# Patient Record
Sex: FEMALE
[De-identification: names, ages, dates, MRNs, and addresses within clinical notes are randomized per-mention and may not be internally consistent; named-entity substitution may affect disease eponyms.]

---

## 2023-04-12 PROBLEM — Z00.00 ENCOUNTER FOR PREVENTIVE HEALTH EXAMINATION: Status: ACTIVE | Noted: 2023-04-12

## 2023-04-13 ENCOUNTER — APPOINTMENT (OUTPATIENT)
Dept: PULMONOLOGY | Facility: CLINIC | Age: 73
End: 2023-04-13
Payer: MEDICARE

## 2023-04-13 VITALS
DIASTOLIC BLOOD PRESSURE: 82 MMHG | HEIGHT: 60 IN | SYSTOLIC BLOOD PRESSURE: 130 MMHG | HEART RATE: 78 BPM | WEIGHT: 90 LBS | OXYGEN SATURATION: 94 % | BODY MASS INDEX: 17.67 KG/M2 | RESPIRATION RATE: 16 BRPM

## 2023-04-13 PROCEDURE — 99203 OFFICE O/P NEW LOW 30 MIN: CPT

## 2023-04-13 NOTE — CONSULT LETTER
[Dear  ___] : Dear  [unfilled], [Consult Letter:] : I had the pleasure of evaluating your patient, [unfilled]. [Please see my note below.] : Please see my note below. [Consult Closing:] : Thank you very much for allowing me to participate in the care of this patient.  If you have any questions, please do not hesitate to contact me. [Sincerely,] : Sincerely, [DrRuben  ___] : Dr. DICKINSON

## 2023-05-12 NOTE — DISCUSSION/SUMMARY
[FreeTextEntry1] : Assess\par \par COPD\par DM allergy\par Rhinititis\par \par Plan\par \par Truncal elevation of 30 degrees for sleep.  Avoid eating within 90 minutes of the hour of sleep.\par Breztri trial\par Hydrate\par CXR\par PFT\par One month FU

## 2023-05-12 NOTE — HISTORY OF PRESENT ILLNESS
[TextBox_4] : 20 pack year smoker - [place] 2005\par Recumbent cough\par DM, mold and IVP dye allergy\par Filtered air, air purifyer, DM measures in place\par [place] and thick am sputum\par ProAir and albuterol neb\par No glaucoma\par PNT on Fluticasone and Azelastine\par Lives in [place] - [place] in [place] \par Pulm in [place] gave her percussive vest

## 2023-05-15 ENCOUNTER — APPOINTMENT (OUTPATIENT)
Dept: PULMONOLOGY | Facility: CLINIC | Age: 73
End: 2023-05-15
Payer: MEDICARE

## 2023-05-15 VITALS
HEART RATE: 85 BPM | SYSTOLIC BLOOD PRESSURE: 138 MMHG | HEIGHT: 57 IN | DIASTOLIC BLOOD PRESSURE: 80 MMHG | OXYGEN SATURATION: 97 % | WEIGHT: 90 LBS | BODY MASS INDEX: 19.41 KG/M2 | RESPIRATION RATE: 16 BRPM

## 2023-05-15 VITALS — BODY MASS INDEX: 19.41 KG/M2 | WEIGHT: 90 LBS | HEIGHT: 57 IN

## 2023-05-15 DIAGNOSIS — J44.9 CHRONIC OBSTRUCTIVE PULMONARY DISEASE, UNSPECIFIED: ICD-10-CM

## 2023-05-15 DIAGNOSIS — R05.3 CHRONIC COUGH: ICD-10-CM

## 2023-05-15 DIAGNOSIS — J30.9 ALLERGIC RHINITIS, UNSPECIFIED: ICD-10-CM

## 2023-05-15 PROCEDURE — 94010 BREATHING CAPACITY TEST: CPT

## 2023-05-15 PROCEDURE — 99214 OFFICE O/P EST MOD 30 MIN: CPT | Mod: 25

## 2023-05-15 RX ORDER — AZITHROMYCIN 250 MG/1
250 TABLET, FILM COATED ORAL
Qty: 45 | Refills: 3 | Status: ACTIVE | COMMUNITY
Start: 2023-05-15 | End: 1900-01-01

## 2023-05-15 RX ORDER — BENZONATATE 200 MG/1
200 CAPSULE ORAL 3 TIMES DAILY
Qty: 90 | Refills: 1 | Status: ACTIVE | COMMUNITY
Start: 2023-05-15 | End: 1900-01-01

## 2023-05-15 NOTE — DISCUSSION/SUMMARY
[FreeTextEntry1] : Assess\par \par COPD\par DM allergy\par Rhinititis\par \par Plan\par \par Truncal elevation of 30 degrees for sleep.  Avoid eating within 90 minutes of the hour of sleep.\par Anoro - Rescue albuterol - technique corrected \par Hydrate\par Benzonatate\par Azelastine\par Suppressive Azithromycin\par Vest with postural drainage when back in [place]par 6 month FU

## 2023-05-15 NOTE — HISTORY OF PRESENT ILLNESS
[TextBox_4] : 20 pack year smoker - [place] 2005\par Recumbent cough\par DM, mold and IVP dye allergy\par Filtered air, air purifyer, DM measures in place\par [place] and thick am sputum\par ProAir and albuterol neb\par No glaucoma\par PNT on Fluticasone and Azelastine\par Lives in [place] - [place] in [place] \par [place]m in [place] gave her percussive vest \par \par 5/15/23\par Dislikes steroids of all forms\par Azelastine helped a little\par Tickle in throat\par Minimal mucous\par Back to [place] next week

## 2023-06-14 ENCOUNTER — APPOINTMENT (OUTPATIENT)
Dept: PULMONOLOGY | Facility: CLINIC | Age: 73
End: 2023-06-14

## 2023-09-20 ENCOUNTER — OFFICE (OUTPATIENT)
Dept: URBAN - METROPOLITAN AREA CLINIC 104 | Facility: CLINIC | Age: 73
Setting detail: OPHTHALMOLOGY
End: 2023-09-20
Payer: MEDICARE

## 2023-09-20 DIAGNOSIS — H25.12: ICD-10-CM

## 2023-09-20 DIAGNOSIS — H25.13: ICD-10-CM

## 2023-09-20 DIAGNOSIS — H43.813: ICD-10-CM

## 2023-09-20 DIAGNOSIS — H34.8121: ICD-10-CM

## 2023-09-20 DIAGNOSIS — H35.033: ICD-10-CM

## 2023-09-20 PROCEDURE — 92134 CPTRZ OPH DX IMG PST SGM RTA: CPT | Performed by: SPECIALIST

## 2023-09-20 PROCEDURE — 92136 OPHTHALMIC BIOMETRY: CPT | Performed by: SPECIALIST

## 2023-09-20 PROCEDURE — 92004 COMPRE OPH EXAM NEW PT 1/>: CPT | Performed by: SPECIALIST

## 2023-09-20 ASSESSMENT — REFRACTION_MANIFEST
OS_CYLINDER: -1.00
OS_VA2: 20/30(J2)
OS_SPHERE: -5.00
OS_AXIS: 125
OS_ADD: +2.75
OS_VA1: 20/70

## 2023-09-20 ASSESSMENT — REFRACTION_CURRENTRX
OD_AXIS: 24
OD_CYLINDER: -1.50
OS_AXIS: 124
OD_SPHERE: -2.50
OS_SPHERE: -3.00
OD_OVR_VA: 20/
OS_CYLINDER: -1.25
OD_ADD: +2.00
OS_ADD: +2.00
OS_OVR_VA: 20/

## 2023-09-20 ASSESSMENT — REFRACTION_AUTOREFRACTION
OS_AXIS: 27
OD_SPHERE: -3.50
OS_SPHERE: -6.75
OD_AXIS: 179
OD_CYLINDER: -0.75
OS_CYLINDER: -1.00

## 2023-09-20 ASSESSMENT — KERATOMETRY
OD_K1K2_AVERAGE: 43.86
OD_CYLPOWER_DEGREES: 0.73999999
OS_K1POWER_DIOPTERS: 43.89
OD_AXISANGLE_DEGREES: 85
OS_K1K2_AVERAGE: 44.035
OD_K1POWER_DIOPTERS: 43.49
OS_CYLAXISANGLE_DEGREES: 7
OS_AXISANGLE_DEGREES: 7
OS_CYLPOWER_DEGREES: 0.29
OD_K1POWER_DIOPTERS: 43.49
OD_K2POWER_DIOPTERS: 44.23
OD_CYLAXISANGLE_DEGREES: 85
OD_AXISANGLE_DEGREES: 85
OS_AXISANGLE2_DEGREES: 7
OS_K2POWER_DIOPTERS: 44.18
OS_K1POWER_DIOPTERS: 43.89
OD_AXISANGLE2_DEGREES: 85
OS_K2POWER_DIOPTERS: 44.18
OS_AXISANGLE_DEGREES: 7
OD_K2POWER_DIOPTERS: 44.23

## 2023-09-20 ASSESSMENT — VISUAL ACUITY
OD_BCVA: 20/70
OS_BCVA: 20/25

## 2023-09-20 ASSESSMENT — TONOMETRY
OD_IOP_MMHG: 12
OS_IOP_MMHG: 12

## 2023-09-20 ASSESSMENT — CONFRONTATIONAL VISUAL FIELD TEST (CVF)
OD_FINDINGS: FULL
OS_FINDINGS: FULL

## 2023-09-20 ASSESSMENT — AXIALLENGTH_DERIVED
OS_AL: 25.71
OS_AL: 26.55
OD_AL: 25.05

## 2023-09-20 ASSESSMENT — SPHEQUIV_DERIVED
OD_SPHEQUIV: -3.875
OS_SPHEQUIV: -7.25
OS_SPHEQUIV: -5.5

## 2023-09-28 ENCOUNTER — RX ONLY (RX ONLY)
Age: 73
End: 2023-09-28

## 2023-09-29 ENCOUNTER — ASC (OUTPATIENT)
Dept: URBAN - METROPOLITAN AREA SURGERY 8 | Facility: SURGERY | Age: 73
Setting detail: OPHTHALMOLOGY
End: 2023-09-29
Payer: MEDICARE

## 2023-09-29 DIAGNOSIS — H52.212: ICD-10-CM

## 2023-09-29 DIAGNOSIS — H25.12: ICD-10-CM

## 2023-09-29 PROCEDURE — 66984 XCAPSL CTRC RMVL W/O ECP: CPT | Performed by: SPECIALIST

## 2023-09-29 PROCEDURE — FEMTO FEMTOSECOND LASER: Performed by: SPECIALIST

## 2023-09-30 ENCOUNTER — OFFICE (OUTPATIENT)
Dept: URBAN - METROPOLITAN AREA CLINIC 104 | Facility: CLINIC | Age: 73
Setting detail: OPHTHALMOLOGY
End: 2023-09-30
Payer: MEDICARE

## 2023-09-30 DIAGNOSIS — Z96.1: ICD-10-CM

## 2023-09-30 PROCEDURE — 99024 POSTOP FOLLOW-UP VISIT: CPT | Performed by: OPTOMETRIST

## 2023-09-30 ASSESSMENT — VISUAL ACUITY
OS_BCVA: 20/25
OD_BCVA: 20/100

## 2023-09-30 ASSESSMENT — CONFRONTATIONAL VISUAL FIELD TEST (CVF)
OD_FINDINGS: FULL
OS_FINDINGS: FULL

## 2023-09-30 ASSESSMENT — TONOMETRY: OS_IOP_MMHG: 12

## 2023-09-30 ASSESSMENT — REFRACTION_CURRENTRX
OD_OVR_VA: 20/
OS_OVR_VA: 20/

## 2023-10-04 ENCOUNTER — OFFICE (OUTPATIENT)
Dept: URBAN - METROPOLITAN AREA CLINIC 104 | Facility: CLINIC | Age: 73
Setting detail: OPHTHALMOLOGY
End: 2023-10-04
Payer: MEDICARE

## 2023-10-04 DIAGNOSIS — Z96.1: ICD-10-CM

## 2023-10-04 PROBLEM — H34.8121 CRVO; LEFT EYE W/RET NEOVASCULARIZATION: Status: ACTIVE | Noted: 2023-09-20

## 2023-10-04 PROBLEM — H35.033: Status: ACTIVE | Noted: 2023-09-20

## 2023-10-04 PROBLEM — H25.11: Status: ACTIVE | Noted: 2023-09-20

## 2023-10-04 PROBLEM — H43.813 POSTERIOR VITREOUS DETACHMENT; BOTH EYES: Status: ACTIVE | Noted: 2023-09-20

## 2023-10-04 PROCEDURE — 99024 POSTOP FOLLOW-UP VISIT: CPT | Performed by: OPTOMETRIST

## 2023-10-04 ASSESSMENT — KERATOMETRY
OD_K1POWER_DIOPTERS: 43.32
OD_AXISANGLE_DEGREES: 092
OS_K1POWER_DIOPTERS: 43.66
OD_K2POWER_DIOPTERS: 44.00
OS_AXISANGLE_DEGREES: 128
OS_K2POWER_DIOPTERS: 43.77

## 2023-10-04 ASSESSMENT — REFRACTION_AUTOREFRACTION
OS_CYLINDER: -0.75
OD_CYLINDER: -0.75
OD_SPHERE: -3.25
OS_SPHERE: -1.75
OD_AXIS: 177
OS_AXIS: 098

## 2023-10-04 ASSESSMENT — CONFRONTATIONAL VISUAL FIELD TEST (CVF)
OD_FINDINGS: FULL
OS_FINDINGS: FULL

## 2023-10-04 ASSESSMENT — AXIALLENGTH_DERIVED
OS_AL: 24.37
OD_AL: 25.03

## 2023-10-04 ASSESSMENT — VISUAL ACUITY
OS_BCVA: 20/25
OD_BCVA: 20/100

## 2023-10-04 ASSESSMENT — SPHEQUIV_DERIVED
OS_SPHEQUIV: -2.125
OD_SPHEQUIV: -3.625

## 2023-10-04 ASSESSMENT — TONOMETRY: OS_IOP_MMHG: 10

## 2023-10-25 ENCOUNTER — OFFICE (OUTPATIENT)
Dept: URBAN - METROPOLITAN AREA CLINIC 104 | Facility: CLINIC | Age: 73
Setting detail: OPHTHALMOLOGY
End: 2023-10-25
Payer: MEDICARE

## 2023-10-25 DIAGNOSIS — Z96.1: ICD-10-CM

## 2023-10-25 PROBLEM — H16.222 DRY EYE SYNDROME K SICCA; LEFT EYE: Status: ACTIVE | Noted: 2023-10-25

## 2023-10-25 PROCEDURE — 99024 POSTOP FOLLOW-UP VISIT: CPT | Performed by: OPTOMETRIST

## 2023-10-25 ASSESSMENT — REFRACTION_AUTOREFRACTION
OS_AXIS: 091
OS_CYLINDER: -0.25
OS_SPHERE: -1.75
OD_AXIS: 175
OD_CYLINDER: -0.25
OD_SPHERE: -3.25

## 2023-10-25 ASSESSMENT — KERATOMETRY
OD_K2POWER_DIOPTERS: 44.00
OD_K1POWER_DIOPTERS: 43.32
OS_AXISANGLE_DEGREES: 025
OS_K2POWER_DIOPTERS: 43.77
OS_K1POWER_DIOPTERS: 43.66
OD_AXISANGLE_DEGREES: 085

## 2023-10-25 ASSESSMENT — REFRACTION_MANIFEST
OS_VA1: 20/25
OS_SPHERE: -2.00
OS_ADD: +2.50
OS_CYLINDER: SPH

## 2023-10-25 ASSESSMENT — SUPERFICIAL PUNCTATE KERATITIS (SPK): OS_SPK: T 1+

## 2023-10-25 ASSESSMENT — VISUAL ACUITY
OD_BCVA: 20/80
OS_BCVA: 20/25

## 2023-10-25 ASSESSMENT — SPHEQUIV_DERIVED
OS_SPHEQUIV: -1.875
OD_SPHEQUIV: -3.375

## 2023-10-25 ASSESSMENT — CONFRONTATIONAL VISUAL FIELD TEST (CVF)
OS_FINDINGS: FULL
OD_FINDINGS: FULL

## 2023-10-25 ASSESSMENT — AXIALLENGTH_DERIVED
OS_AL: 24.26
OD_AL: 24.92

## 2023-10-25 ASSESSMENT — TONOMETRY: OS_IOP_MMHG: 10

## 2023-11-30 ENCOUNTER — OFFICE (OUTPATIENT)
Dept: URBAN - METROPOLITAN AREA CLINIC 104 | Facility: CLINIC | Age: 73
Setting detail: OPHTHALMOLOGY
End: 2023-11-30
Payer: MEDICARE

## 2023-11-30 DIAGNOSIS — H52.4: ICD-10-CM

## 2023-11-30 DIAGNOSIS — Z96.1: ICD-10-CM

## 2023-11-30 PROBLEM — H53.2 DIPLOPIA: Status: ACTIVE | Noted: 2023-11-30

## 2023-11-30 PROCEDURE — 92015 DETERMINE REFRACTIVE STATE: CPT | Performed by: OPTOMETRIST

## 2023-11-30 PROCEDURE — 99024 POSTOP FOLLOW-UP VISIT: CPT | Performed by: OPTOMETRIST

## 2023-11-30 ASSESSMENT — REFRACTION_MANIFEST
OS_HPRISM: 6
OD_VPRISM: BU
OD_CYLINDER: -1.00
OS_AXIS: 120
OD_SPHERE: -2.50
OS_CYLINDER: -0.50
OD_CYLINDER: -1.50
OS_VA1: 20/30
OS_AXIS: 130
OS_ADD: +3.00
OD_VA1: 20/25
OD_AXIS: 035
OD_AXIS: 22
OD_ADD: +2.50
OS_SPHERE: -2.00
OD_ADD: +3.00
OS_CYLINDER: -0.50
OS_SPHERE: -2.00
OS_VA1: 20/40
OS_ADD: +2.50
OD_SPHERE: -2.25
OS_VPRISM_DIRECTION: BO
OD_VA1: 20/25

## 2023-11-30 ASSESSMENT — SPHEQUIV_DERIVED
OD_SPHEQUIV: -3.625
OS_SPHEQUIV: -2
OD_SPHEQUIV: -3
OD_SPHEQUIV: -3
OS_SPHEQUIV: -2.25
OS_SPHEQUIV: -2.25

## 2023-11-30 ASSESSMENT — REFRACTION_AUTOREFRACTION
OS_CYLINDER: -1.00
OD_AXIS: 3
OD_CYLINDER: -0.75
OD_SPHERE: -3.25
OS_AXIS: 55
OS_SPHERE: -1.50

## 2023-11-30 ASSESSMENT — SUPERFICIAL PUNCTATE KERATITIS (SPK): OS_SPK: T 1+

## 2023-12-06 ENCOUNTER — OFFICE (OUTPATIENT)
Dept: URBAN - METROPOLITAN AREA CLINIC 104 | Facility: CLINIC | Age: 73
Setting detail: OPHTHALMOLOGY
End: 2023-12-06
Payer: MEDICARE

## 2023-12-06 DIAGNOSIS — H53.2: ICD-10-CM

## 2023-12-06 DIAGNOSIS — Z96.1: ICD-10-CM

## 2023-12-06 DIAGNOSIS — H52.13: ICD-10-CM

## 2023-12-06 PROCEDURE — 92015 DETERMINE REFRACTIVE STATE: CPT | Performed by: OPTOMETRIST

## 2023-12-06 PROCEDURE — 92012 INTRM OPH EXAM EST PATIENT: CPT | Performed by: OPTOMETRIST

## 2023-12-06 ASSESSMENT — REFRACTION_AUTOREFRACTION
OS_AXIS: 083
OS_CYLINDER: -1.00
OD_SPHERE: -3.00
OD_CYLINDER: -0.50
OS_SPHERE: -2.25
OD_AXIS: 177

## 2023-12-06 ASSESSMENT — REFRACTION_CURRENTRX
OD_OVR_VA: 20/
OS_ADD: +2.75
OD_AXIS: 014
OD_ADD: +2.75
OS_OVR_VA: 20/
OS_AXIS: 171
OS_CYLINDER: -0.25
OD_CYLINDER: -1.50
OS_SPHERE: -2.50
OD_SPHERE: -2.75

## 2023-12-06 ASSESSMENT — REFRACTION_MANIFEST
OD_SPHERE: -2.75
OD_AXIS: 014
OS_VA1: 20/20--
OD_CYLINDER: -1.50
OS_AXIS: 080
OS_SPHERE: -2.00
OS_CYLINDER: -1.00
OD_VA1: 20/30

## 2023-12-06 ASSESSMENT — SPHEQUIV_DERIVED
OD_SPHEQUIV: -3.5
OD_SPHEQUIV: -3.25
OS_SPHEQUIV: -2.75
OS_SPHEQUIV: -2.5

## 2023-12-06 ASSESSMENT — CONFRONTATIONAL VISUAL FIELD TEST (CVF)
OS_FINDINGS: FULL
OD_FINDINGS: FULL

## 2023-12-06 ASSESSMENT — SUPERFICIAL PUNCTATE KERATITIS (SPK): OS_SPK: T 1+
